# Patient Record
(demographics unavailable — no encounter records)

---

## 2025-04-22 NOTE — PHYSICAL EXAM
[Normal] : not distended, non tender, no masses, no hepatosplenomegaly [de-identified] : wheelchair bound [de-identified] : RRR [de-identified] : easy WOB

## 2025-04-22 NOTE — RESULTS/DATA
[FreeTextEntry1] : *** 01/12/2025 - CT A/P *** INTERPRETATION:  CLINICAL INFORMATION: Elevated liver enzymes, vomiting COMPARISON: None. FINDINGS: LOWER CHEST: Within normal limits. LIVER: Hepatic steatosis. A punctate parenchymal calcification in the right hepatic lobe. BILE DUCTS: Normal caliber. GALLBLADDER: Cholelithiasis. SPLEEN: Within normal limits. PANCREAS: Within normal limits. ADRENALS: Within normal limits. KIDNEYS/URETERS: Within normal limits. BLADDER: Within normal limits. REPRODUCTIVE ORGANS: Within normal limits. BOWEL: C-shaped dilated and fecalized loop of distal ileum with 2 small transition points in the right lower quadrant (5-23) Appendix is normal. Segmental wall thickening of the distal ascending colon with mild stranding and subcentimeter lymph nodes (3-49, 3-54). PERITONEUM/RETROPERITONEUM: Within normal limits. VESSELS: Atherosclerotic changes. LYMPH NODES: No lymphadenopathy. ABDOMINAL WALL: Within normal limits. BONES: Mild compression of T10 body.  IMPRESSION: 1.) Cholelithiasis. 2.) C shaped equalized loop of distal ileum with 2 transition points in the right lower quadrant, concerning for closed obstruction due to internal hernia. Recommend clinical correlation. 3.) Segmental wall thickening of distal ascending colon with stranding and lymph nodes, likely infectious or inflammatory colitis. --- End of Report ---   *** 01/15/2025 - US Abdomen *** INTERPRETATION:  CLINICAL INFORMATION: Elevated LFTs. COMPARISON: CT abdomen/pelvis 1/12/2025. TECHNIQUE: Sonography of the right upper quadrant. FINDINGS: Liver: Increased echogenicity. Bile ducts: Normal caliber. Common bile duct measures 9 mm. Two common bile duct stones, 0.6 cm and 0.4 cm. Gallbladder: Cholelithiasis. Wall measures at upper limits of normal, 0.3 cm. Negative sonographic Thomas's sign. Pancreas: Visualized portions are within normal limits. Right kidney: 10.0 cm. No hydronephrosis. Ascites: None. IVC: Visualized portions are within normal limits.  IMPRESSION: 1.) Cholelithiasis without evidence of acute cholecystitis. 2.) Mildly dilated common bile duct, 0.9 cm, with choledocholithiasis. --- End of Report ---  *** 01/23/2025 INTERPRETATION:  CLINICAL INFORMATION: Biliary cancer. Suspicious findings during attempted cholecystectomy. COMPARISON: CT January 12, 2025 CONTRAST/COMPLICATIONS: IV Contrast: Gadavist  5 cc administered   2.5 cc discarded Oral Contrast: NONE FINDINGS: LOWER CHEST: Trace bilateral pleural effusions. LIVER: Subtle findings of cirrhosis with enlargement of segments 1, 2 and 3 and mild contour nodularity. No focal hepatic lesion. BILE DUCTS: Numerous filling defects throughout the common duct suspect for choledocholithiasis. The common duct is mildly dilated, measuring 1.0 cm. GALLBLADDER: Numerous gallstones. 6.2 x 2.9 cm fluid collection in the right subhepatic space, inferior to the gallbladder fossa. Abnormal enhancement and restricted diffusion in the pericholecystic liver parenchyma. SPLEEN: Within normal limits. PANCREAS: Within normal limits. ADRENALS: Within normal limits. KIDNEYS/URETERS: Within normal limits. VISUALIZED PORTIONS: BOWEL: Within normal limits. PERITONEUM: Small amount of perihepatic ascites. VESSELS: Hepatic, portal and superior mesenteric veins are patent. RETROPERITONEUM/LYMPH NODES: No lymphadenopathy. ABDOMINAL WALL: Within normal limits. BONES: Within normal limits. VISUALIZED PELVIS: Right-sided bladder diverticulum. Prominent endometrium.  IMPRESSION: 1.) Numerous gallstones. with inflammatory changes adjacent to the gallbladder suspicious for acute cholecystitis. 2.) No discrete hepatobiliary mass identified. 3.) 6.2 cm fluid collection in the subhepatic space. 4.) Probable cirrhosis. 5.) Prominent endometrium. Recommend ultrasound to better evaluate. --- End of Report ---  *** 03/21/2025 - CT A/P *** INTERPRETATION:  CLINICAL INFORMATION: epigastric pain XCT COMPARISON: 1.12.25. FINDINGS: LOWER CHEST: Within normal limits. LIVER: Heterogeneous liver lesion adjacent to the gallbladder fundus. BILE DUCTS: Moderate biliary dilatation down to the ampulla. GALLBLADDER: Heterogeneous gallbladder. SPLEEN: Within normal limits. PANCREAS: Within normal limits. ADRENALS: Within normal limits. KIDNEYS/URETERS: Within normal limits. BLADDER: Within normal limits. REPRODUCTIVE ORGANS: Prominent endometrium is unchanged. BOWEL: No bowel obstruction. The appendix is normal. PERITONEUM/RETROPERITONEUM: Right-sided peritoneal nodules including a 16 mm nodule on series 3 image 68. VESSELS:  Within normal limits. LYMPH NODES: Within normal limits. ABDOMINAL WALL: Soft tissue thickening at the umbilicus. BONES: Degenerative changes.  IMPRESSION:  1) Heterogeneous gallbladder. Infiltrative lesion in the liver adjacent to the gallbladder fundus. Worsening moderate biliary dilatation. Findings suspicious for neoplastic disease. 2) Right-sided peritoneal implants. 3) Umbilical implant. --- End of Report ---  *** 03/22/2025 - US Abdomen RUQ *** INTERPRETATION:  CLINICAL INFORMATION: Right upper quadrant pain. COMPARISON: 01/15/2025. FINDINGS: Liver: Within normal limits. Bile ducts: Normal caliber. Common bile duct measures 5 mm. Gallbladder: Contracted gallbladder with evidence of gallstones. Negative Thomas's sign. Pancreas: Visualized portions are within normal limits. Right kidney: 9.3 cm. No hydronephrosis. Ascites: None. IVC: Visualized portions are within normal limits. IMPRESSION: Redemonstration of cholelithiasis. Biliary dilatation noted on the prior study is not identified at this time.  --- End of Report ---  *** 03/23/2025 - SP CHOLANGIOGRAM TRANSHEPATIC *** INTERPRETATION:  History: Sepsis. Biliary obstruction. Patient has an esophageal stricture and cannot have an ERCP. Patient is here for emergent placement of a percutaneous biliary drain. Findings: Ultrasound shows dilation of the left intrahepatic bile ducts. The cholangiogram shows multiple filling defects in the distal common bile duct, likely stones. The right and left internal biliary ducts are seen. Final image shows the internal/external biliary drain in good position. IMPRESSION: 1.) ESSFUL PLACEMENT OF A LEFT-SIDED 10 [place] INTERNAL/EXTERNAL BILIARY DRAINAGE CATHETER.  --- End of Report ---  *** 03/31/2025 - MR CHOLANGIOPANCREATOGRAPHY *** INTERPRETATION:  CLINICAL INFORMATION: Biliary sepsis. Esophageal stricture. Percutaneous biliary drainage catheter. COMPARISON: Contrast-enhanced CT of the abdomen and pelvis March 21, 2025, pre and postcontrast MRI/MRCP January 23, 2025. PROCEDURE: MRI/MRCP was performed. Radial and 3D MRCP sequences were obtained. FINDINGS: LOWER CHEST: Within normal limits. LIVER: There is a 3.1 x 2.4 cm region of T2 hyperintensity within segment IVb adjacent to a previously noted inflamed gallbladder. BILE DUCTS: There is a left-sided percutaneous internal/external biliary drain. There are numerous hypointense layering filling defects throughout the common bile duct, compatible with extensive choledocholithiasis. There is mild intrahepatic biliary dilatation. The common bile duct measures up to 10 mm in diameter. GALLBLADDER: The thickened gallbladder is contracted around numerous stones. SPLEEN: Within normal limits. PANCREAS: Within normal limits. Pancreas ductus divisum. There is no peripancreatic stranding or fluid collection. ADRENALS: Within normal limits. KIDNEYS/URETERS: Within normal limits. VISUALIZED PORTIONS: BOWEL: Within normal limits. PERITONEUM: No ascites. VESSELS: Within normal limits. RETROPERITONEUM/LYMPH NODES: No lymphadenopathy. ABDOMINAL WALL: Within normal limits. BONES: Within normal limits.  IMPRESSION:  1. Exsive choledocholithiasis of the common bile duct.  --- End of Report ---

## 2025-04-22 NOTE — REASON FOR VISIT
[Initial Consultation] : an initial consultation for [Referred By: ___] : Referred By: [unfilled] [Family Member] : family member [FreeTextEntry2] : suspected metastatic cancer

## 2025-04-22 NOTE — RESULTS/DATA
[FreeTextEntry1] : *** 01/12/2025 - CT A/P *** INTERPRETATION:  CLINICAL INFORMATION: Elevated liver enzymes, vomiting COMPARISON: None. FINDINGS: LOWER CHEST: Within normal limits. LIVER: Hepatic steatosis. A punctate parenchymal calcification in the right hepatic lobe. BILE DUCTS: Normal caliber. GALLBLADDER: Cholelithiasis. SPLEEN: Within normal limits. PANCREAS: Within normal limits. ADRENALS: Within normal limits. KIDNEYS/URETERS: Within normal limits. BLADDER: Within normal limits. REPRODUCTIVE ORGANS: Within normal limits. BOWEL: C-shaped dilated and fecalized loop of distal ileum with 2 small transition points in the right lower quadrant (5-23) Appendix is normal. Segmental wall thickening of the distal ascending colon with mild stranding and subcentimeter lymph nodes (3-49, 3-54). PERITONEUM/RETROPERITONEUM: Within normal limits. VESSELS: Atherosclerotic changes. LYMPH NODES: No lymphadenopathy. ABDOMINAL WALL: Within normal limits. BONES: Mild compression of T10 body.  IMPRESSION: 1.) Cholelithiasis. 2.) C shaped equalized loop of distal ileum with 2 transition points in the right lower quadrant, concerning for closed obstruction due to internal hernia. Recommend clinical correlation. 3.) Segmental wall thickening of distal ascending colon with stranding and lymph nodes, likely infectious or inflammatory colitis. --- End of Report ---   *** 01/15/2025 - US Abdomen *** INTERPRETATION:  CLINICAL INFORMATION: Elevated LFTs. COMPARISON: CT abdomen/pelvis 1/12/2025. TECHNIQUE: Sonography of the right upper quadrant. FINDINGS: Liver: Increased echogenicity. Bile ducts: Normal caliber. Common bile duct measures 9 mm. Two common bile duct stones, 0.6 cm and 0.4 cm. Gallbladder: Cholelithiasis. Wall measures at upper limits of normal, 0.3 cm. Negative sonographic Thomas's sign. Pancreas: Visualized portions are within normal limits. Right kidney: 10.0 cm. No hydronephrosis. Ascites: None. IVC: Visualized portions are within normal limits.  IMPRESSION: 1.) Cholelithiasis without evidence of acute cholecystitis. 2.) Mildly dilated common bile duct, 0.9 cm, with choledocholithiasis. --- End of Report ---  *** 01/23/2025 INTERPRETATION:  CLINICAL INFORMATION: Biliary cancer. Suspicious findings during attempted cholecystectomy. COMPARISON: CT January 12, 2025 CONTRAST/COMPLICATIONS: IV Contrast: Gadavist  5 cc administered   2.5 cc discarded Oral Contrast: NONE FINDINGS: LOWER CHEST: Trace bilateral pleural effusions. LIVER: Subtle findings of cirrhosis with enlargement of segments 1, 2 and 3 and mild contour nodularity. No focal hepatic lesion. BILE DUCTS: Numerous filling defects throughout the common duct suspect for choledocholithiasis. The common duct is mildly dilated, measuring 1.0 cm. GALLBLADDER: Numerous gallstones. 6.2 x 2.9 cm fluid collection in the right subhepatic space, inferior to the gallbladder fossa. Abnormal enhancement and restricted diffusion in the pericholecystic liver parenchyma. SPLEEN: Within normal limits. PANCREAS: Within normal limits. ADRENALS: Within normal limits. KIDNEYS/URETERS: Within normal limits. VISUALIZED PORTIONS: BOWEL: Within normal limits. PERITONEUM: Small amount of perihepatic ascites. VESSELS: Hepatic, portal and superior mesenteric veins are patent. RETROPERITONEUM/LYMPH NODES: No lymphadenopathy. ABDOMINAL WALL: Within normal limits. BONES: Within normal limits. VISUALIZED PELVIS: Right-sided bladder diverticulum. Prominent endometrium.  IMPRESSION: 1.) Numerous gallstones. with inflammatory changes adjacent to the gallbladder suspicious for acute cholecystitis. 2.) No discrete hepatobiliary mass identified. 3.) 6.2 cm fluid collection in the subhepatic space. 4.) Probable cirrhosis. 5.) Prominent endometrium. Recommend ultrasound to better evaluate. --- End of Report ---  *** 03/21/2025 - CT A/P *** INTERPRETATION:  CLINICAL INFORMATION: epigastric pain XCT COMPARISON: 1.12.25. FINDINGS: LOWER CHEST: Within normal limits. LIVER: Heterogeneous liver lesion adjacent to the gallbladder fundus. BILE DUCTS: Moderate biliary dilatation down to the ampulla. GALLBLADDER: Heterogeneous gallbladder. SPLEEN: Within normal limits. PANCREAS: Within normal limits. ADRENALS: Within normal limits. KIDNEYS/URETERS: Within normal limits. BLADDER: Within normal limits. REPRODUCTIVE ORGANS: Prominent endometrium is unchanged. BOWEL: No bowel obstruction. The appendix is normal. PERITONEUM/RETROPERITONEUM: Right-sided peritoneal nodules including a 16 mm nodule on series 3 image 68. VESSELS:  Within normal limits. LYMPH NODES: Within normal limits. ABDOMINAL WALL: Soft tissue thickening at the umbilicus. BONES: Degenerative changes.  IMPRESSION:  1) Heterogeneous gallbladder. Infiltrative lesion in the liver adjacent to the gallbladder fundus. Worsening moderate biliary dilatation. Findings suspicious for neoplastic disease. 2) Right-sided peritoneal implants. 3) Umbilical implant. --- End of Report ---  *** 03/22/2025 - US Abdomen RUQ *** INTERPRETATION:  CLINICAL INFORMATION: Right upper quadrant pain. COMPARISON: 01/15/2025. FINDINGS: Liver: Within normal limits. Bile ducts: Normal caliber. Common bile duct measures 5 mm. Gallbladder: Contracted gallbladder with evidence of gallstones. Negative Thomas's sign. Pancreas: Visualized portions are within normal limits. Right kidney: 9.3 cm. No hydronephrosis. Ascites: None. IVC: Visualized portions are within normal limits. IMPRESSION: Redemonstration of cholelithiasis. Biliary dilatation noted on the prior study is not identified at this time.  --- End of Report ---  *** 03/23/2025 - SP CHOLANGIOGRAM TRANSHEPATIC *** INTERPRETATION:  History: Sepsis. Biliary obstruction. Patient has an esophageal stricture and cannot have an ERCP. Patient is here for emergent placement of a percutaneous biliary drain. Findings: Ultrasound shows dilation of the left intrahepatic bile ducts. The cholangiogram shows multiple filling defects in the distal common bile duct, likely stones. The right and left internal biliary ducts are seen. Final image shows the internal/external biliary drain in good position. IMPRESSION: 1.) ESSFUL PLACEMENT OF A LEFT-SIDED 10 [place] INTERNAL/EXTERNAL BILIARY DRAINAGE CATHETER.  --- End of Report ---  *** 03/31/2025 - MR CHOLANGIOPANCREATOGRAPHY *** INTERPRETATION:  CLINICAL INFORMATION: Biliary sepsis. Esophageal stricture. Percutaneous biliary drainage catheter. COMPARISON: Contrast-enhanced CT of the abdomen and pelvis March 21, 2025, pre and postcontrast MRI/MRCP January 23, 2025. PROCEDURE: MRI/MRCP was performed. Radial and 3D MRCP sequences were obtained. FINDINGS: LOWER CHEST: Within normal limits. LIVER: There is a 3.1 x 2.4 cm region of T2 hyperintensity within segment IVb adjacent to a previously noted inflamed gallbladder. BILE DUCTS: There is a left-sided percutaneous internal/external biliary drain. There are numerous hypointense layering filling defects throughout the common bile duct, compatible with extensive choledocholithiasis. There is mild intrahepatic biliary dilatation. The common bile duct measures up to 10 mm in diameter. GALLBLADDER: The thickened gallbladder is contracted around numerous stones. SPLEEN: Within normal limits. PANCREAS: Within normal limits. Pancreas ductus divisum. There is no peripancreatic stranding or fluid collection. ADRENALS: Within normal limits. KIDNEYS/URETERS: Within normal limits. VISUALIZED PORTIONS: BOWEL: Within normal limits. PERITONEUM: No ascites. VESSELS: Within normal limits. RETROPERITONEUM/LYMPH NODES: No lymphadenopathy. ABDOMINAL WALL: Within normal limits. BONES: Within normal limits.  IMPRESSION:  1. Exsive choledocholithiasis of the common bile duct.  --- End of Report ---

## 2025-04-22 NOTE — HISTORY OF PRESENT ILLNESS
[de-identified] : Ms. Elvi Tyler is an 79 y/o female presenting for possible malignancy.  She explains that on 01/12/2025, she was admitted to [facility], complaining of abdominal pain, N/V, subjective fevers/chills x 1 day. Work up revealed choledocholithiasis complicated by septic shock 2/2 ascending cholangitis. Imaging showed a 3.1 x 2.4 cm region of T2 hyperintensity within segment IVb of the liver, as well as peritoneal disease.  An ERCP and lap jordan has been attempted but was unable to be completed due to strictures and adhesions. 03/31/2025 MRCP showed the thickened gallbladder is contracted around numerous stones. Reported was a 3.1 x 2.4 cm region of T2 hyperintensity within segment IVb adjacent to a previously noted inflamed gallbladder. There is a left-sided percutaneous internal/external biliary drain. There are numerous hypointense layering filling defects throughout the common bile duct, compatible with extensive choledocholithiasis. There is mild intrahepatic biliary dilatation. The common bile duct measures up to 10 mm in diameter.  ERCP done on 4/4/25, with removal of PTBD at the same time. Today, Ms. Tyler presents for initial consultation. She is doing well overall, denies any fevers, chills, n/v. Doing well. She is in a wheelchair today due to weakness, but has been overall doing much better since her hospitalization. Her path has returned as non-diagnostic.

## 2025-04-22 NOTE — ASSESSMENT
[FreeTextEntry1] : Ms. Elvi Tyler is an 81 y/o female presenting for evaluation of possible malignancy with peritoneal disease.  I reviewed her PMHx, Surgical Hx, and imaging/biopsy results today with her and her daughter. I explained that her imaging is suspicious for metastatic cancer, however the bile duct brushings were non-diagnostic. I will place a referral for an IR biopsy of one of the peritoneal nodules, which measure approximately 2cm in size, or the liver lesion near the gallbladder. We discussed that surgical intervention is unlikely to be an option, but once we get the diagnosis we can consider other treatment options like systemic therapy. She is scheduled to see Dr. Taylor next week as well.  PLAN: 1) IR referral for peritoneal nodule biopsy 2) Follow up with Dr. Taylor 3) return SHANNON Barron MD Division of Surgical Oncology [facility] Phone: [phone] Fax: [phone]  Today, I personally spent 61 minutes in total time including reviewing imaging and studies, discussing complex treatment regimens, direct face to face time with the patient, patient education, answering patient questions and counseling, excluding separately billable procedures and billing time.  This note was written by Lubna Mckay on 04/21/2025, acting solely as a scribe for Dr. Judd Barron MD. I have documented the information dictated during the patient encounter for the following sections: RFV, HPI, ROS, PE, ASSESSMENT/PLAN.   I personally performed the services described in the documentation, reviewed the documentation recorded by the scribe in my presence, and it accurately and completely records my words and actions.

## 2025-04-22 NOTE — HISTORY OF PRESENT ILLNESS
[de-identified] : Ms. Elvi Tyler is an 79 y/o female presenting for possible malignancy.  She explains that on 01/12/2025, she was admitted to [facility], complaining of abdominal pain, N/V, subjective fevers/chills x 1 day. Work up revealed choledocholithiasis complicated by septic shock 2/2 ascending cholangitis. Imaging showed a 3.1 x 2.4 cm region of T2 hyperintensity within segment IVb of the liver, as well as peritoneal disease.  An ERCP and lap jordan has been attempted but was unable to be completed due to strictures and adhesions. 03/31/2025 MRCP showed the thickened gallbladder is contracted around numerous stones. Reported was a 3.1 x 2.4 cm region of T2 hyperintensity within segment IVb adjacent to a previously noted inflamed gallbladder. There is a left-sided percutaneous internal/external biliary drain. There are numerous hypointense layering filling defects throughout the common bile duct, compatible with extensive choledocholithiasis. There is mild intrahepatic biliary dilatation. The common bile duct measures up to 10 mm in diameter.  ERCP done on 4/4/25, with removal of PTBD at the same time. Today, Ms. Tyler presents for initial consultation. She is doing well overall, denies any fevers, chills, n/v. Doing well. She is in a wheelchair today due to weakness, but has been overall doing much better since her hospitalization. Her path has returned as non-diagnostic.

## 2025-04-22 NOTE — PHYSICAL EXAM
[Normal] : not distended, non tender, no masses, no hepatosplenomegaly [de-identified] : wheelchair bound [de-identified] : RRR [de-identified] : easy WOB

## 2025-04-22 NOTE — ASSESSMENT
[FreeTextEntry1] : Ms. Elvi Tyler is an 79 y/o female presenting for evaluation of possible malignancy with peritoneal disease.  I reviewed her PMHx, Surgical Hx, and imaging/biopsy results today with her and her daughter. I explained that her imaging is suspicious for metastatic cancer, however the bile duct brushings were non-diagnostic. I will place a referral for an IR biopsy of one of the peritoneal nodules, which measure approximately 2cm in size, or the liver lesion near the gallbladder. We discussed that surgical intervention is unlikely to be an option, but once we get the diagnosis we can consider other treatment options like systemic therapy. She is scheduled to see Dr. Taylor next week as well.  PLAN: 1) IR referral for peritoneal nodule biopsy 2) Follow up with Dr. Taylor 3) return SHANNON Barron MD Division of Surgical Oncology [facility] Phone: [phone] Fax: [phone]  Today, I personally spent 61 minutes in total time including reviewing imaging and studies, discussing complex treatment regimens, direct face to face time with the patient, patient education, answering patient questions and counseling, excluding separately billable procedures and billing time.  This note was written by Lubna Mckay on 04/21/2025, acting solely as a scribe for Dr. Judd Barron MD. I have documented the information dictated during the patient encounter for the following sections: RFV, HPI, ROS, PE, ASSESSMENT/PLAN.   I personally performed the services described in the documentation, reviewed the documentation recorded by the scribe in my presence, and it accurately and completely records my words and actions.

## 2025-04-29 NOTE — ASSESSMENT
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  -She is scheduled for an IR biopsy mid May, will request to move up date to expedite diagnosis and treatment. She is currently feeling weak, with poor appetite, she will follow up after biopsy to review the pathology and for treatment recs.

## 2025-04-29 NOTE — HISTORY OF PRESENT ILLNESS
[de-identified] : 80 year old F with PMHx of essential hypertension, type 2 diabetes mellitus, hypothyroidism and dyslipidemia who was diagnosed with SIV cancer.  She was recently admitted to [facility] for choledocholithiasis complicated by septic shock 2/2 ascending cholangitis.  Imaging during admission showed heterogeneous gallbladder. Infiltrative lesion in the liver adjacent to the gallbladder fundus. Worsening moderate biliary dilatation. Findings suspicious for neoplastic disease. Right-sided peritoneal implants. Umbilical implant.  MRCP showed the thickened gallbladder is contracted around numerous stones.  There is a 3.1 x 2.4 cm region of T2 hyperintensity within segment IVb adjacent to a previously noted inflamed gallbladder.  There is a left-sided percutaneous internal/external biliary drain. There are numerous hypointense layering filling defects throughout the common bile duct, compatible with extensive choledocholithiasis. There is mild intrahepatic biliary dilatation. The common bile duct measures up to 10 mm in diameter.  ERCP done on 4/4/25.

## 2025-04-29 NOTE — HISTORY OF PRESENT ILLNESS
[de-identified] : 80 year old F with PMHx of essential hypertension, type 2 diabetes mellitus, hypothyroidism and dyslipidemia who was diagnosed with SIV cancer.  She was recently admitted to [facility] for choledocholithiasis complicated by septic shock 2/2 ascending cholangitis.  Imaging during admission showed heterogeneous gallbladder. Infiltrative lesion in the liver adjacent to the gallbladder fundus. Worsening moderate biliary dilatation. Findings suspicious for neoplastic disease. Right-sided peritoneal implants. Umbilical implant.  MRCP showed the thickened gallbladder is contracted around numerous stones.  There is a 3.1 x 2.4 cm region of T2 hyperintensity within segment IVb adjacent to a previously noted inflamed gallbladder.  There is a left-sided percutaneous internal/external biliary drain. There are numerous hypointense layering filling defects throughout the common bile duct, compatible with extensive choledocholithiasis. There is mild intrahepatic biliary dilatation. The common bile duct measures up to 10 mm in diameter.  ERCP done on 4/4/25.

## 2025-04-29 NOTE — REVIEW OF SYSTEMS
[Patient Intake Form Reviewed] : Patient intake form was reviewed [Fever] : no fever [Fatigue] : fatigue [Recent Change In Weight] : ~T no recent weight change [Eye Pain] : no eye pain [Vision Problems] : no vision problems [Dysphagia] : no dysphagia [Hoarseness] : no hoarseness [Odynophagia] : no odynophagia [Chest Pain] : no chest pain [Lower Ext Edema] : no lower extremity edema [Shortness Of Breath] : no shortness of breath [SOB on Exertion] : no shortness of breath during exertion [Abdominal Pain] : no abdominal pain [Vomiting] : no vomiting [Constipation] : no constipation [Dysuria] : no dysuria [Joint Pain] : no joint pain [Skin Rash] : no skin rash [Confused] : no confusion [Easy Bleeding] : no tendency for easy bleeding [Easy Bruising] : no tendency for easy bruising [Swollen Glands] : no swollen glands

## 2025-04-29 NOTE — HISTORY OF PRESENT ILLNESS
[de-identified] : 80 year old F with PMHx of essential hypertension, type 2 diabetes mellitus, hypothyroidism and dyslipidemia who was diagnosed with SIV cancer.  She was recently admitted to [facility] for choledocholithiasis complicated by septic shock 2/2 ascending cholangitis.  Imaging during admission showed heterogeneous gallbladder. Infiltrative lesion in the liver adjacent to the gallbladder fundus. Worsening moderate biliary dilatation. Findings suspicious for neoplastic disease. Right-sided peritoneal implants. Umbilical implant.  MRCP showed the thickened gallbladder is contracted around numerous stones.  There is a 3.1 x 2.4 cm region of T2 hyperintensity within segment IVb adjacent to a previously noted inflamed gallbladder.  There is a left-sided percutaneous internal/external biliary drain. There are numerous hypointense layering filling defects throughout the common bile duct, compatible with extensive choledocholithiasis. There is mild intrahepatic biliary dilatation. The common bile duct measures up to 10 mm in diameter.  ERCP done on 4/4/25.

## 2025-05-27 NOTE — ASSESSMENT
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  -She is scheduled for an IR biopsy mid May, will request to move up date to expedite diagnosis and treatment. She is currently feeling weak, with poor appetite, she will follow up after biopsy to review the pathology and for treatment recs.   05/27/25: Ms Tyler returns for follow up.  She underwent biopsy of peritoneal nodule 05/13/25 which was consistent with adenocarcinoma of pancreaticobiliary primary. YUDY and Her-2 negative. She continues to lose weight but her appetite remains good.  -Pathology and imaging consistent with adenocarcinoma of biliary tract origin.  I discussed systemic treatment with gemcitabine/cisplatin/durvalumab.  Will need to obtain new baseline imaging prior to treatment so we can accurately determine treatment response after 2-3 months of chemo-immunotherapy. Will modify regimen to drop day 8 gem/cisplatin to improve tolerance and for lifestyle considerations. She will return to initiate pallaitive chemo-immunotherapy.

## 2025-05-27 NOTE — HISTORY OF PRESENT ILLNESS
[de-identified] : 80 year old F with PMHx of essential hypertension, type 2 diabetes mellitus, hypothyroidism and dyslipidemia who was diagnosed with SIV cancer.  She was recently admitted to [facility] for choledocholithiasis complicated by septic shock 2/2 ascending cholangitis.  Imaging during admission showed heterogeneous gallbladder. Infiltrative lesion in the liver adjacent to the gallbladder fundus. Worsening moderate biliary dilatation. Findings suspicious for neoplastic disease. Right-sided peritoneal implants. Umbilical implant.  MRCP showed the thickened gallbladder is contracted around numerous stones.  There is a 3.1 x 2.4 cm region of T2 hyperintensity within segment IVb adjacent to a previously noted inflamed gallbladder.  There is a left-sided percutaneous internal/external biliary drain. There are numerous hypointense layering filling defects throughout the common bile duct, compatible with extensive choledocholithiasis. There is mild intrahepatic biliary dilatation. The common bile duct measures up to 10 mm in diameter.  ERCP done on 4/4/25.  [de-identified] : 05/27/25: Ms Tyler returns for follow up.  She underwent biopsy of peritoneal nodule 05/13/25 which was consistent with adenocarcinoma of pancreaticobiliary primary. YUDY and Her-2 negative. She continues to lose weight but her appetite remains good.

## 2025-05-27 NOTE — REVIEW OF SYSTEMS
[Patient Intake Form Reviewed] : Patient intake form was reviewed [Fatigue] : fatigue [Fever] : no fever [Recent Change In Weight] : ~T no recent weight change [Eye Pain] : no eye pain [Vision Problems] : no vision problems [Dysphagia] : no dysphagia [Hoarseness] : no hoarseness [Odynophagia] : no odynophagia [Chest Pain] : no chest pain [Lower Ext Edema] : no lower extremity edema [Shortness Of Breath] : no shortness of breath [SOB on Exertion] : no shortness of breath during exertion [Abdominal Pain] : no abdominal pain [Vomiting] : no vomiting [Constipation] : no constipation [Dysuria] : no dysuria [Joint Pain] : no joint pain [Skin Rash] : no skin rash [Confused] : no confusion [Easy Bleeding] : no tendency for easy bleeding [Easy Bruising] : no tendency for easy bruising [Swollen Glands] : no swollen glands